# Patient Record
Sex: MALE | Race: WHITE | NOT HISPANIC OR LATINO | URBAN - METROPOLITAN AREA
[De-identification: names, ages, dates, MRNs, and addresses within clinical notes are randomized per-mention and may not be internally consistent; named-entity substitution may affect disease eponyms.]

---

## 2021-06-10 ENCOUNTER — EVALUATION (OUTPATIENT)
Dept: PHYSICAL THERAPY | Age: 71
End: 2021-06-10
Payer: MEDICARE

## 2021-06-10 DIAGNOSIS — M16.12 OSTEOARTHRITIS OF LEFT HIP, UNSPECIFIED OSTEOARTHRITIS TYPE: ICD-10-CM

## 2021-06-10 DIAGNOSIS — M25.552 LEFT HIP PAIN: Primary | ICD-10-CM

## 2021-06-10 PROCEDURE — 97140 MANUAL THERAPY 1/> REGIONS: CPT | Performed by: PHYSICAL THERAPIST

## 2021-06-10 PROCEDURE — 97162 PT EVAL MOD COMPLEX 30 MIN: CPT | Performed by: PHYSICAL THERAPIST

## 2021-06-10 PROCEDURE — 97110 THERAPEUTIC EXERCISES: CPT | Performed by: PHYSICAL THERAPIST

## 2021-06-10 NOTE — LETTER
2021    Sangita Helms MD  [facility]    Patient: Ira Estrada   YOB: 1950   Date of Visit: 6/10/2021     Encounter Diagnosis     ICD-10-CM    1  Left hip pain  M25 552    2  Osteoarthritis of left hip, unspecified osteoarthritis type  M16 12        Dear Dr Albania Cruz: Thank you for your recent referral of Ira Estrada  Please review the attached evaluation summary from [facility] recent visit  Please verify that you agree with the plan of care by signing the attached order  If you have any questions or concerns, please do not hesitate to call  I sincerely appreciate the opportunity to share in the care of one of your patients and hope to have another opportunity to work with you in the near future  Sincerely,    Jeb Anaya, PT      Referring Provider:      I certify that I have read the below Plan of Care and certify the need for these services furnished under this plan of treatment while under my care  Sangita Helms MD  [facility]  Via Fax: [phone]          PT Evaluation     Today's date: 6/10/2021  Patient name: Ira Estrada  : 1950  MRN: [id number]  Referring provider: Victor M Cevallos MD  Dx:   Encounter Diagnosis     ICD-10-CM    1  Left hip pain  M25 552    2  Osteoarthritis of left hip, unspecified osteoarthritis type  M16 12        Start Time: 1300  Stop Time: 1400  Total time in clinic (min): 60 minutes    Assessment  Assessment details: Ira Estrada is a 79 y o  male who presents with pain, decreased ROM and ambulatory dysfunction  Due to these impairments, Patient has difficulty performing a/iadls and recreational activities  Patient's clinical presentation is consistent with their referring diagnosis of left hip OA/pain  Patient was instructed in HEP today with copy given   Patient would benefit from skilled physical therapy to address their aforementioned impairments, improve their level of function and to improve their overall quality of life  Impairments: abnormal or restricted ROM, activity intolerance, impaired physical strength, lacks appropriate home exercise program and pain with function    Symptom irritability: lowUnderstanding of Dx/Px/POC: good   Prognosis: good    Goals  ST-3 WEEKS  1  Decrease pain left hip < 7/10 on VAS at its worst   2   Increase AROM by > 5 deg in all deficients planes  3   Increase core and LLE strength by 1/2 MMT grade in all deficient planes  LT-6 WEEKS  1  Patient to be independent with a/iadls  2  Increase functional activities for leisure and home activities to previous LOF  3  Independent with HEP and/or fitness program     Plan  Patient would benefit from: skilled physical therapy  Planned modality interventions: cryotherapy, thermotherapy: hydrocollator packs and unattended electrical stimulation  Planned therapy interventions: activity modification, behavior modification, body mechanics training, aquatic therapy, flexibility, functional ROM exercises, home exercise program, IADL retraining, joint mobilization, manual therapy, neuromuscular re-education, patient education, postural training, strengthening, stretching, therapeutic activities, therapeutic exercise, abdominal trunk stabilization and balance/weight bearing training  Frequency: 2-3x week  Duration in weeks: 12  Plan of Care beginning date: 6/10/2021  Plan of Care expiration date: 9/10/2021  Treatment plan discussed with: patient        Subjective Evaluation    History of Present Illness  Mechanism of injury: Patient reports his back was hurting last year and he had xrays and MRI detecting L4-5 HNP  About 2 months ago, he stated his left hip began to present issues with pain and lack of mobility  He saw ortho who stated OA but not ready for surgery yet and referred to PT     Quality of life: excellent    Pain  Current pain ratin  At worst pain ratin  Location: left hip  Quality: sharp and knife-like  Relieving factors: change in position and rest  Progression: no change    Social Support  Steps to enter house: yes  Stairs in house: no   Lives in: [place]  Lives with: alone    Employment status: not working    Diagnostic Tests  X-ray: abnormal  MRI studies: abnormal  Patient Goals  Patient goals for therapy: decreased pain, increased motion, increased strength and return to sport/leisure activities  Patient goal: able to put leg over tractor, motorcycle, able to [place]  Objective     Neurological Testing     Sensation     Hip   Left Hip   Intact: light touch and hot/cold discrimination    Active Range of Motion     Lumbar   Flexion: Active lumbar flexion: tips to proximal shin  Restriction level: moderate  Extension: 5 degrees  Restriction level: maximal  Left lateral flexion: 15 degrees       Right lateral flexion: 15 degrees   Left Hip   Flexion: 105 degrees   Abduction: 18 degrees with pain  External rotation (90/90): 18 degrees   Internal rotation (90/90): 10 degrees with pain    Right Hip   Flexion: 100 degrees   Abduction: 18 degrees   External rotation (90/90): 30 degrees   Internal rotation (90/90): 10 degrees     Additional Active Range of Motion Details  Tight hamstrings bilateral 60°  Tight end feel both hips    Strength/Myotome Testing     Left Hip   Planes of Motion   Flexion: 4  Extension: 4+  Abduction: 4+  Adduction: 5    Right Hip   Planes of Motion   Flexion: 4+  Extension: 4+  Abduction: 4+  Adduction: 5    Left Knee   Flexion: 5  Extension: 5    Right Knee   Flexion: 5  Extension: 5    Tests     Left Hip   Positive FADIR  Negative SAVI  Right Hip   Negative SAVI and FADIR       Ambulation   Weight-Bearing Status   Weight-Bearing Status (Left): weight-bearing as tolerated   Assistive device used: none    Ambulation: Level Surfaces   Ambulation without assistive device: independent    Ambulation: Stairs Pattern: reciprocal  Pattern: reciprocal    Observational Gait   Gait: antalgic   Decreased walking speed  Quality of Movement During Gait     Foot Alignment    Foot Alignment (Left): Positive planovalgus  Foot Alignment (Right): Positive planovalgus       Functional Assessment        Single Leg Stance   Left: 2 seconds  Right: 5 seconds             Precautions: DJD, OA      Manuals 6/10            LB/LE's/ Hams 10'                                                   Neuro Re-Ed                                       Ther Ex             HEP 10'            B heel slides 30x            L heel slides 30x            bridges 30x            Standing SL hip flex, abd, ext HEP            SLR L nv            SLR L w/ER nv            Hip abd-band 30x            Hip add-ball 30x                                      nustep 5'            Slant board 30"4x                         Ther Activity                                       Gait Training                                       Modalities

## 2021-06-10 NOTE — PROGRESS NOTES
PT Evaluation     Today's date: 6/10/2021  Patient name: Óscar Brar  : 1950  MRN: [id number]  Referring provider: Riley Ramírez MD  Dx:   Encounter Diagnosis     ICD-10-CM    1  Left hip pain  M25 552    2  Osteoarthritis of left hip, unspecified osteoarthritis type  M16 12        Start Time: 1300  Stop Time: 1400  Total time in clinic (min): 60 minutes    Assessment  Assessment details: Óscar Brar is a 79 y o  male who presents with pain, decreased ROM and ambulatory dysfunction  Due to these impairments, Patient has difficulty performing a/iadls and recreational activities  Patient's clinical presentation is consistent with their referring diagnosis of left hip OA/pain  Patient was instructed in HEP today with copy given  Patient would benefit from skilled physical therapy to address their aforementioned impairments, improve their level of function and to improve their overall quality of life  Impairments: abnormal or restricted ROM, activity intolerance, impaired physical strength, lacks appropriate home exercise program and pain with function    Symptom irritability: lowUnderstanding of Dx/Px/POC: good   Prognosis: good    Goals  ST-3 WEEKS  1  Decrease pain left hip < 7/10 on VAS at its worst   2   Increase AROM by > 5 deg in all deficients planes  3   Increase core and LLE strength by 1/2 MMT grade in all deficient planes  LT-6 WEEKS  1  Patient to be independent with a/iadls  2  Increase functional activities for leisure and home activities to previous LOF    3  Independent with HEP and/or fitness program     Plan  Patient would benefit from: skilled physical therapy  Planned modality interventions: cryotherapy, thermotherapy: hydrocollator packs and unattended electrical stimulation  Planned therapy interventions: activity modification, behavior modification, body mechanics training, aquatic therapy, flexibility, functional ROM exercises, home exercise program, IADL retraining, joint mobilization, manual therapy, neuromuscular re-education, patient education, postural training, strengthening, stretching, therapeutic activities, therapeutic exercise, abdominal trunk stabilization and balance/weight bearing training  Frequency: 2-3x week  Duration in weeks: 12  Plan of Care beginning date: 6/10/2021  Plan of Care expiration date: 9/10/2021  Treatment plan discussed with: patient        Subjective Evaluation    History of Present Illness  Mechanism of injury: Patient reports his back was hurting last year and he had xrays and MRI detecting L4-5 HNP  About 2 months ago, he stated his left hip began to present issues with pain and lack of mobility  He saw ortho who stated OA but not ready for surgery yet and referred to PT  Quality of life: excellent    Pain  Current pain ratin  At worst pain ratin  Location: left hip  Quality: sharp and knife-like  Relieving factors: change in position and rest  Progression: no change    Social Support  Steps to enter house: yes  Stairs in house: no   Lives in: [facility]  Lives with: alone    Employment status: not working    Diagnostic Tests  X-ray: abnormal  MRI studies: abnormal  Patient Goals  Patient goals for therapy: decreased pain, increased motion, increased strength and return to sport/leisure activities  Patient goal: able to put leg over tractor, motorcycle, able to [place]  Objective     Neurological Testing     Sensation     Hip   Left Hip   Intact: light touch and hot/cold discrimination    Active Range of Motion     Lumbar   Flexion: Active lumbar flexion: tips to proximal shin    Restriction level: moderate  Extension: 5 degrees  Restriction level: maximal  Left lateral flexion: 15 degrees       Right lateral flexion: 15 degrees   Left Hip   Flexion: 105 degrees   Abduction: 18 degrees with pain  External rotation (90/90): 18 degrees   Internal rotation (90/90): 10 degrees with pain    Right Hip   Flexion: 100 degrees   Abduction: 18 degrees   External rotation (90/90): 30 degrees   Internal rotation (90/90): 10 degrees     Additional Active Range of Motion Details  Tight hamstrings bilateral 60°  Tight end feel both hips    Strength/Myotome Testing     Left Hip   Planes of Motion   Flexion: 4  Extension: 4+  Abduction: 4+  Adduction: 5    Right Hip   Planes of Motion   Flexion: 4+  Extension: 4+  Abduction: 4+  Adduction: 5    Left Knee   Flexion: 5  Extension: 5    Right Knee   Flexion: 5  Extension: 5    Tests     Left Hip   Positive FADIR  Negative SAVI  Right Hip   Negative SAVI and FADIR  Ambulation   Weight-Bearing Status   Weight-Bearing Status (Left): weight-bearing as tolerated   Assistive device used: none    Ambulation: Level Surfaces   Ambulation without assistive device: independent    Ambulation: Stairs   Pattern: reciprocal  Pattern: reciprocal    Observational Gait   Gait: antalgic   Decreased walking speed  Quality of Movement During Gait     Foot Alignment    Foot Alignment (Left): Positive planovalgus  Foot Alignment (Right): Positive planovalgus       Functional Assessment        Single Leg Stance   Left: 2 seconds  Right: 5 seconds             Precautions: DJD, OA      Manuals 6/10            LB/LE's/ Hams 10'                                                   Neuro Re-Ed                                       Ther Ex             HEP 10'            B heel slides 30x            L heel slides 30x            bridges 30x            Standing SL hip flex, abd, ext HEP            SLR L nv            SLR L w/ER nv            Hip abd-band 30x            Hip add-ball 30x                                      nustep 5'            Slant board 30"4x                         Ther Activity                                       Gait Training                                       Modalities

## 2021-06-11 ENCOUNTER — TRANSCRIBE ORDERS (OUTPATIENT)
Dept: PHYSICAL THERAPY | Age: 71
End: 2021-06-11

## 2021-06-11 DIAGNOSIS — M16.12 OSTEOARTHRITIS OF LEFT HIP, UNSPECIFIED OSTEOARTHRITIS TYPE: ICD-10-CM

## 2021-06-11 DIAGNOSIS — M25.552 LEFT HIP PAIN: Primary | ICD-10-CM

## 2021-06-14 ENCOUNTER — OFFICE VISIT (OUTPATIENT)
Dept: PHYSICAL THERAPY | Age: 71
End: 2021-06-14
Payer: MEDICARE

## 2021-06-14 DIAGNOSIS — M16.12 OSTEOARTHRITIS OF LEFT HIP, UNSPECIFIED OSTEOARTHRITIS TYPE: ICD-10-CM

## 2021-06-14 DIAGNOSIS — M25.552 LEFT HIP PAIN: Primary | ICD-10-CM

## 2021-06-14 PROCEDURE — 97110 THERAPEUTIC EXERCISES: CPT | Performed by: PHYSICAL THERAPIST

## 2021-06-14 NOTE — PROGRESS NOTES
Daily Note     Today's date: 2021  Patient name: Suki Porter  : 1950  MRN: [id number]  Referring provider: Katherine Petit MD  Dx:   Encounter Diagnosis     ICD-10-CM    1  Left hip pain  M25 552    2  Osteoarthritis of left hip, unspecified osteoarthritis type  M16 12        Start Time: 0730  Stop Time: 0815  Total time in clinic (min): 45 minutes    Subjective: Patient reports he was a little sore all over after last session  Objective: See treatment diary below      Assessment: Tolerated treatment well  Patient would benefit from continued PT  Stiffness noted in left hip compared to right  Plan: Continue per plan of care        Precautions: DJD, OA      Manuals 6/10 6/14           LB/LE's/ Hams 10' 10                                                  Neuro Re-Ed             Side/side  2x           SLS   nv           Forw/back tandem  2x           Ther Ex             HEP 10'            B heel slides 30x 30x           L heel slides 30x 30x           bridges 30x 30x           Standing SL hip flex, abd, ext HEP            SLR L nv 30x           SLR L w/ER nv 3x10           Hip abd-band 30x 30x           Hip add-ball 30x 30x                                     nustep 5' 6'           Slant board 30"4x 30' 4x L 2                        Ther Activity                                       Gait Training                                       Modalities

## 2021-06-18 ENCOUNTER — OFFICE VISIT (OUTPATIENT)
Dept: PHYSICAL THERAPY | Age: 71
End: 2021-06-18
Payer: MEDICARE

## 2021-06-18 DIAGNOSIS — M25.552 LEFT HIP PAIN: Primary | ICD-10-CM

## 2021-06-18 DIAGNOSIS — M16.12 OSTEOARTHRITIS OF LEFT HIP, UNSPECIFIED OSTEOARTHRITIS TYPE: ICD-10-CM

## 2021-06-18 PROCEDURE — 97112 NEUROMUSCULAR REEDUCATION: CPT | Performed by: PHYSICAL THERAPIST

## 2021-06-18 PROCEDURE — 97110 THERAPEUTIC EXERCISES: CPT | Performed by: PHYSICAL THERAPIST

## 2021-06-18 PROCEDURE — 97140 MANUAL THERAPY 1/> REGIONS: CPT | Performed by: PHYSICAL THERAPIST

## 2021-06-18 NOTE — PROGRESS NOTES
Daily Note     Today's date: 2021  Patient name: Julisa Barrera  : 1950  MRN: [id number]  Referring provider: Rylie Greer MD  Dx:   Encounter Diagnosis     ICD-10-CM    1  Left hip pain  M25 552    2  Osteoarthritis of left hip, unspecified osteoarthritis type  M16 12        Start Time: 0844  Stop Time: 0815  Total time in clinic (min): 40 minutes    Subjective: Patient reports the same, but not worse  Notes less pinching in his left hip overall  Objective: See treatment diary below      Assessment: Tolerated treatment well, bl hamstring tightness, some end range hip tightness all planes, no c/o pain with TE  Patient exhibited good technique with therapeutic exercises and would benefit from continued PT      Plan: Continue per plan of care        Precautions: DJD, OA      Manuals 6/10 6/14 6/18 6/21         LB/LE's/ Hams 10' 10 10                                                 Neuro Re-Ed             Side/side  2x 2x blue 2x         SLS   nv  10"x2         Forw/back tandem  2x 2x 2x         Ther Ex             HEP 10'            B heel slides 30x 30x 30x 30x         L heel slides 30x 30x 30x 30x         bridges 30x 30x 30x 30x         Standing SL hip flex, abd, ext HEP  Hip [id number] 30x         SLR L nv 30x 30x 30x         SLR L w/ER nv 3x10 30x 30x         Hip abd-band 30x 30x 30x 30x         Hip add-ball 30x 30x nt          Clamshell L   30x 30x         Hip abduction SL Left   30x 30x         nustep 5' 6' 7' 7'         Slant board 30"4x 30' 4x L 2 30" 4x 30"x4         Hip adduction   30# 30x 30#/30         Hip abduction   30# 30x 30#/30         Ther Activity                                       Gait Training                                       Modalities

## 2021-06-21 ENCOUNTER — OFFICE VISIT (OUTPATIENT)
Dept: PHYSICAL THERAPY | Age: 71
End: 2021-06-21
Payer: MEDICARE

## 2021-06-21 DIAGNOSIS — M25.552 LEFT HIP PAIN: Primary | ICD-10-CM

## 2021-06-21 DIAGNOSIS — M16.12 OSTEOARTHRITIS OF LEFT HIP, UNSPECIFIED OSTEOARTHRITIS TYPE: ICD-10-CM

## 2021-06-21 PROCEDURE — 97112 NEUROMUSCULAR REEDUCATION: CPT

## 2021-06-21 PROCEDURE — 97110 THERAPEUTIC EXERCISES: CPT

## 2021-06-25 ENCOUNTER — OFFICE VISIT (OUTPATIENT)
Dept: PHYSICAL THERAPY | Age: 71
End: 2021-06-25
Payer: MEDICARE

## 2021-06-25 DIAGNOSIS — M25.552 LEFT HIP PAIN: Primary | ICD-10-CM

## 2021-06-25 DIAGNOSIS — M16.12 OSTEOARTHRITIS OF LEFT HIP, UNSPECIFIED OSTEOARTHRITIS TYPE: ICD-10-CM

## 2021-06-25 PROCEDURE — 97110 THERAPEUTIC EXERCISES: CPT | Performed by: PHYSICAL THERAPIST

## 2021-06-25 PROCEDURE — 97140 MANUAL THERAPY 1/> REGIONS: CPT | Performed by: PHYSICAL THERAPIST

## 2021-06-25 PROCEDURE — 97530 THERAPEUTIC ACTIVITIES: CPT | Performed by: PHYSICAL THERAPIST

## 2021-06-25 NOTE — PROGRESS NOTES
Daily Note     Today's date: 2021  Patient name: Jolynn Marrero  : 1950  MRN: [id number]  Referring provider: Topher Read MD  Dx:   Encounter Diagnosis     ICD-10-CM    1  Left hip pain  M25 552    2  Osteoarthritis of left hip, unspecified osteoarthritis type  M16 12        Start Time: 0700  Stop Time: 08  Total time in clinic (min): 65 minutes    Subjective: Patient reports he still gets the sharp pain down left leg with certain movements  Objective: See treatment diary below      Assessment: Tolerated treatment well  Patient would benefit from continued PT  Decreased ROM left hip ER and stiff with abduction  Added hip flexor stretch standing left  Pain with walking on hill or unlevel surface  Added step ups onto slant board L2 and gets shooting pains when knee flexed  Plan: Continue per plan of care        Precautions: DJD, OA      Manuals 6/10 6/14 6/18 6/21 6/25        LB/LE's/ Hams 10' 10 10  10                                               Neuro Re-Ed             Side/side  2x 2x blue 2x 4x        SLS   nv  10"x2 5" 5x        Forw/back tandem  2x 2x 2x nt        Ther Ex             HEP 10'            B heel slides 30x 30x 30x 30x 30x        L heel slides 30x 30x 30x 30x 30x        bridges 30x 30x 30x 30x 30x        Standing SL hip flex, abd, ext HEP  Hip [id number] 30x         SLR L nv 30x 30x 30x 1# 30x        SLR L w/ER nv 3x10 30x 30x 1# 30x        Hip abd-band 30x 30x 30x 30x 1# 30x        Hip add-ball 30x 30x nt  home        Clamshell L   30x 30x 30x        Hip abduction SL Left   30x 30x 30x        nustep 5' 6' 7' 7' D/c        Slant board 30"4x 30' 4x L 2 30" 4x 30"x4 4x        Hip adduction   30# 30x 30#/30 30# 30x        Hip abduction   30# 30x 30#/30 30#         Leg press     50# 30x        Stand ball press     5" 10x        Ther Activity             DLS     2'        bike     10'        Step up onto slant L2     5x                                  Modalities

## 2021-06-28 ENCOUNTER — OFFICE VISIT (OUTPATIENT)
Dept: PHYSICAL THERAPY | Age: 71
End: 2021-06-28
Payer: MEDICARE

## 2021-06-28 DIAGNOSIS — M16.12 OSTEOARTHRITIS OF LEFT HIP, UNSPECIFIED OSTEOARTHRITIS TYPE: ICD-10-CM

## 2021-06-28 DIAGNOSIS — M25.552 LEFT HIP PAIN: Primary | ICD-10-CM

## 2021-06-28 PROCEDURE — 97112 NEUROMUSCULAR REEDUCATION: CPT

## 2021-06-28 PROCEDURE — 97110 THERAPEUTIC EXERCISES: CPT

## 2021-06-28 NOTE — PROGRESS NOTES
Daily Note     Today's date: 2021  Patient name: Pascale Manjarrez  : 1950  MRN: [id number]  Referring provider: Lorena Urbina MD  Dx:   Encounter Diagnosis     ICD-10-CM    1  Left hip pain  M25 552    2  Osteoarthritis of left hip, unspecified osteoarthritis type  M16 12        Start Time: 0745  Stop Time: 0830  Total time in clinic (min): 45 minutes    Subjective: Patient reports about the same with everything  Still having sharp pain in his left hip with certain movements  Objective: See treatment diary below      Assessment: Tolerated treatment well,Decreased ROM left hip ER and stiff with abduction  SLR challenges  Patient exhibited good technique with therapeutic exercises and would benefit from continued PT      Plan: Continue per plan of care        Precautions: DJD, OA      Manuals 6/10 6/14 6/18 6/21 6/25 6/28       LB/LE's/ Hams 10' 10 10  10 10                                              Neuro Re-Ed             Side/side  2x 2x blue 2x 4x 4x       SLS   nv  10"x2 5" 5x 10"x3       Forw/back tandem  2x 2x 2x nt nt       Ther Ex             HEP 10'            B heel slides 30x 30x 30x 30x 30x 30x       L heel slides 30x 30x 30x 30x 30x 30x       bridges 30x 30x 30x 30x 30x 30x       Standing SL hip flex, abd, ext HEP  Hip [id number] 30x         SLR L nv 30x 30x 30x 1# 30x 1#/30       SLR L w/ER nv 3x10 30x 30x 1# 30x 1#/30       Hip abd-band 30x 30x 30x 30x 1# 30x 1#/30       Hip add-ball 30x 30x nt  home        Clamshell L   30x 30x 30x 30x       Hip abduction SL Left   30x 30x 30x 30x       nustep 5' 6' 7' 7' D/c        Slant board 30"4x 30' 4x L 2 30" 4x 30"x4 4x 4x       Hip adduction   30# 30x 30#/30 30# 30x 30#/30       Hip abduction   30# 30x 30#/30 30#  30#/30       Leg press     50# 30x 50#/30       Stand ball press     5" 10x 5x10       Ther Activity             DLS     2' nt       bike     10' 10'       Step up onto slant L2     5x 4x Modalities

## 2021-07-02 ENCOUNTER — OFFICE VISIT (OUTPATIENT)
Dept: PHYSICAL THERAPY | Age: 71
End: 2021-07-02
Payer: MEDICARE

## 2021-07-02 DIAGNOSIS — M25.552 LEFT HIP PAIN: Primary | ICD-10-CM

## 2021-07-02 DIAGNOSIS — M16.12 OSTEOARTHRITIS OF LEFT HIP, UNSPECIFIED OSTEOARTHRITIS TYPE: ICD-10-CM

## 2021-07-02 PROCEDURE — 97110 THERAPEUTIC EXERCISES: CPT | Performed by: PHYSICAL THERAPIST

## 2021-07-02 PROCEDURE — 97112 NEUROMUSCULAR REEDUCATION: CPT | Performed by: PHYSICAL THERAPIST

## 2021-07-02 NOTE — PROGRESS NOTES
Daily Note     Today's date: 2021  Patient name: Darya Akins  : 1950  MRN: [id number]  Referring provider: Howard Denis MD  Dx:   Encounter Diagnosis     ICD-10-CM    1  Left hip pain  M25 552    2  Osteoarthritis of left hip, unspecified osteoarthritis type  M16 12        Start Time: 0700  Stop Time: 08  Total time in clinic (min): 65 minutes    Subjective: Patient still c/o sharp pain with sporadic movements of left hip, especially stepping out of golf cart or on unlevel surfaces, up hills  Objective: See treatment diary below      Assessment: Tolerated treatment well  Patient would benefit from continued PT  Stiffness in left hip and hamstring noted compared to right  Patient is compliant with HEP and stretches  No sharp pains today on step  Plan: Continue per plan of care        Precautions: DJD, OA      Manuals 6/10 6/14 6/18 6/21 6/25 6/28 7      LB/LE's/ Hams 10' 10 10  10 10 10                                             Neuro Re-Ed             Side/side  2x 2x blue 2x 4x 4x 4x      SLS   nv  10"x2 5" 5x 10"x3 10" 4x      Forw/back tandem  2x 2x 2x nt nt       Ther Ex             HEP 10'            B heel slides 30x 30x 30x 30x 30x 30x 30x      L heel slides 30x 30x 30x 30x 30x 30x       bridges 30x 30x 30x 30x 30x 30x 30x      Standing SL hip flex, abd, ext HEP  Hip [id number] 30x         SLR L nv 30x 30x 30x 1# 30x 1#/30 1# 30x      SLR L w/ER nv 3x10 30x 30x 1# 30x 1#/30 1# 30x                                Clamshell L   30x 30x 30x 30x 1# 30x      Hip abduction SL Left   30x 30x 30x 30x 1# 30x                   Slant board 30"4x 30' 4x L 2 30" 4x 30"x4 4x 4x 4x      Hip adduction   30# 30x 30#/30 30# 30x 30#/30 35# 30x      Hip abduction   30# 30x 30#/30 30#  30#/30 35# 30x      Leg press     50# 30x 50#/30 60# 30x      Stand ball press     5" 10x 5x10 5" 20x      Ther Activity             DLS     2' nt       bike     10' 10' 10'      Step up onto slant L2     5x 4x 10x                                Modalities

## 2021-07-06 ENCOUNTER — OFFICE VISIT (OUTPATIENT)
Dept: PHYSICAL THERAPY | Age: 71
End: 2021-07-06
Payer: MEDICARE

## 2021-07-06 DIAGNOSIS — M16.12 OSTEOARTHRITIS OF LEFT HIP, UNSPECIFIED OSTEOARTHRITIS TYPE: ICD-10-CM

## 2021-07-06 DIAGNOSIS — M25.552 LEFT HIP PAIN: Primary | ICD-10-CM

## 2021-07-06 PROCEDURE — 97110 THERAPEUTIC EXERCISES: CPT

## 2021-07-06 PROCEDURE — 97112 NEUROMUSCULAR REEDUCATION: CPT

## 2021-07-06 NOTE — PROGRESS NOTES
Daily Note     Today's date: 2021  Patient name: Gaye Bernardo  : 1950  MRN: [id number]  Referring provider: Kandace Dillon MD  Dx:   Encounter Diagnosis     ICD-10-CM    1  Left hip pain  M25 552    2  Osteoarthritis of left hip, unspecified osteoarthritis type  M16 12        Start Time: 0815  Stop Time: 0900  Total time in clinic (min): 45 minutes    Subjective: Patient reports about the same, notes its not constant anymore  Objective: See treatment diary below      Assessment: Tolerated treatment well, continues to have increased tightness bl hips, added sl leg press with good tolerance, no c/o pain with TE, increased step ups with slant board but only tolerated 3x due to pain in the left hip  Patient demonstrated fatigue post treatment and exhibited good technique with therapeutic exercises      Plan: Continue per plan of care        Precautions: DJD, OA      Manuals 6/10 6/14 6/18 6/21 6/25 6/28 7/2 7/6     LB/LE's/ Hams 10' 10 10  10 10 10 10                                            Neuro Re-Ed             Side/side  2x 2x blue 2x 4x 4x 4x 4x     SLS   nv  10"x2 5" 5x 10"x3 10" 4x 10"  4x     Forw/back tandem  2x 2x 2x nt nt       Ther Ex             HEP 10'            B heel slides 30x 30x 30x 30x 30x 30x 30x 30x     L heel slides 30x 30x 30x 30x 30x 30x       bridges 30x 30x 30x 30x 30x 30x 30x 30x     Standing SL hip flex, abd, ext HEP  Hip [id number] 30x         SLR L nv 30x 30x 30x 1# 30x 1#/30 1# 30x 1#/  30     SLR L w/ER nv 3x10 30x 30x 1# 30x 1#/30 1# 30x 1#/  30                               Clamshell L   30x 30x 30x 30x 1# 30x 1#/30     Hip abduction SL Left   30x 30x 30x 30x 1# 30x 1#/30                  Slant board 30"4x 30' 4x L 2 30" 4x 30"x4 4x 4x 4x 4x     Hip adduction   30# 30x 30#/30 30# 30x 30#/30 35# 30x 35#/30     Hip abduction   30# 30x 30#/30 30#  30#/30 35# 30x 35#/30     Leg press     50# 30x 50#/30 60# 30x 60#/30x     Stand ball press     5" 10x 5x10 5" 20x 5"x20     Ther Activity             DLS     2' nt       bike     10' 10' 10' 10'     Step up onto slant L2     5x 4x 10x 10x  [email or web address]     SL leg press        30#/30                  Modalities

## 2021-07-07 ENCOUNTER — APPOINTMENT (OUTPATIENT)
Dept: PHYSICAL THERAPY | Age: 71
End: 2021-07-07
Payer: MEDICARE

## 2021-07-09 ENCOUNTER — OFFICE VISIT (OUTPATIENT)
Dept: PHYSICAL THERAPY | Age: 71
End: 2021-07-09
Payer: MEDICARE

## 2021-07-09 DIAGNOSIS — M25.552 LEFT HIP PAIN: Primary | ICD-10-CM

## 2021-07-09 DIAGNOSIS — M16.12 OSTEOARTHRITIS OF LEFT HIP, UNSPECIFIED OSTEOARTHRITIS TYPE: ICD-10-CM

## 2021-07-09 PROCEDURE — 97530 THERAPEUTIC ACTIVITIES: CPT | Performed by: PHYSICAL THERAPIST

## 2021-07-09 PROCEDURE — 97110 THERAPEUTIC EXERCISES: CPT | Performed by: PHYSICAL THERAPIST

## 2021-07-09 NOTE — PROGRESS NOTES
Daily Note     Today's date: 2021  Patient name: Becca Ramos  : 1950  MRN: [id number]  Referring provider: Jamie Crouch MD  Dx:   Encounter Diagnosis     ICD-10-CM    1  Left hip pain  M25 552    2  Osteoarthritis of left hip, unspecified osteoarthritis type  M16 12        Start Time: 0700  Stop Time: 0804  Total time in clinic (min): 64 minutes    Subjective: Patient reports no change  Objective: See treatment diary below      Assessment: Tolerated treatment well  Patient would benefit from continued PT  Stiffness in left hip  Still gets sharp pains in anterior hip with abducted LE's, but forward is better  Plan: Continue per plan of care        Precautions: DJD, OA      Manuals 6/10 6/14 6/18 6/21 6/25 6/28 7/2 7/6 7/9    LB/LE's/ Hams 10' 10 10  10 10 10 10 10                                           Neuro Re-Ed             Side/side  2x 2x blue 2x 4x 4x 4x 4x 2xea reg, squat    Forward step ups         4" 10x    SLS   nv  10"x2 5" 5x 10"x3 10" 4x 10"  4x nt    Lateral step ups         4" 20x    Ther Ex             HEP 10'            B heel slides 30x 30x 30x 30x 30x 30x 30x 30x 30x    L heel slides 30x 30x 30x 30x 30x 30x       bridges 30x 30x 30x 30x 30x 30x 30x 30x 30x    Standing SL hip flex, abd, ext HEP  Hip [id number] 30x         SLR L nv 30x 30x 30x 1# 30x 1#/30 1# 30x 1#/  30 1# 30x    SLR L w/ER nv 3x10 30x 30x 1# 30x 1#/30 1# 30x 1#/  30 1# 30x                              Clamshell L   30x 30x 30x 30x 1# 30x 1#/30 1# 30x    Hip abduction SL Left   30x 30x 30x 30x 1# 30x 1#/30 1# 30x                 Slant board 30"4x 30' 4x L 2 30" 4x 30"x4 4x 4x 4x 4x 4x    Hip adduction   30# 30x 30#/30 30# 30x 30#/30 35# 30x 35#/30 35# 30x    Hip abduction   30# 30x 30#/30 30#  30#/30 35# 30x 35#/30 35# 30x    Leg press     50# 30x 50#/30 60# 30x 60#/30x 60# 30x    Stand ball press     5" 10x 5x10 5" 20x 5"x20 5" 20x    Ther Activity             DLS     2' nt       bike     10' 10' 10' 10' 10'    Step up onto slant L2     5x 4x 10x 10x  [email or web address] 20x    SL leg press L        30#/30 50# 30x                 Modalities

## 2021-07-12 ENCOUNTER — EVALUATION (OUTPATIENT)
Dept: PHYSICAL THERAPY | Age: 71
End: 2021-07-12
Payer: MEDICARE

## 2021-07-12 DIAGNOSIS — M25.552 LEFT HIP PAIN: Primary | ICD-10-CM

## 2021-07-12 DIAGNOSIS — M16.12 OSTEOARTHRITIS OF LEFT HIP, UNSPECIFIED OSTEOARTHRITIS TYPE: ICD-10-CM

## 2021-07-12 PROCEDURE — 97110 THERAPEUTIC EXERCISES: CPT | Performed by: PHYSICAL THERAPIST

## 2021-07-12 PROCEDURE — 97112 NEUROMUSCULAR REEDUCATION: CPT | Performed by: PHYSICAL THERAPIST

## 2021-07-12 NOTE — LETTER
2021    Konrad Rajan MD  [facility]    Patient: Amita Bishop   YOB: 1950   Date of Visit: 2021     Encounter Diagnosis     ICD-10-CM    1  Left hip pain  M25 552    2  Osteoarthritis of left hip, unspecified osteoarthritis type  M16 12        Dear Dr Rodger Dale: Thank you for your recent referral of Amita Bishop  Please review the attached evaluation summary from [facility] recent visit  Please verify that you agree with the plan of care by signing the attached order  If you have any questions or concerns, please do not hesitate to call  I sincerely appreciate the opportunity to share in the care of one of your patients and hope to have another opportunity to work with you in the near future  Sincerely,    Harmony Mir, PT      Referring Provider:      I certify that I have read the below Plan of Care and certify the need for these services furnished under this plan of treatment while under my care  Konrad Rajan MD  [facility]  Via Fax: [phone]          PT Re-Evaluation     Today's date: 2021  Patient name: Amita Bishop  : 1950  MRN: [id number]  Referring provider: Tricia Trotter MD  Dx:   Encounter Diagnosis     ICD-10-CM    1  Left hip pain  M25 552    2  Osteoarthritis of left hip, unspecified osteoarthritis type  M16 12        Start Time: 0700  Stop Time: 0800  Total time in clinic (min): 60 minutes    Assessment  Assessment details: Amita Bishop is a 79 y o  male who presents with pain, decreased ROM and ambulatory dysfunction  Due to these impairments, Patient has difficulty performing a/iadls and recreational activities  Patient's clinical presentation is consistent with their referring diagnosis of left hip OA/pain  Patient has been having PT for a month  He feels the pain is more frequent and even with walking now   He is more flexible but significant tight end range left hip and hamstring compared to right  Patient gets sharp pain in anterior left hip when left hip is flexed or abducted  HEP today with copy given  Patient would benefit from skilled physical therapy to address their aforementioned impairments, improve their level of function and to improve their overall quality of life  Impairments: abnormal or restricted ROM, activity intolerance, impaired physical strength, lacks appropriate home exercise program and pain with function    Symptom irritability: lowUnderstanding of Dx/Px/POC: good   Prognosis: good    Goals  ST-3 WEEKS  1  Decrease pain left hip < 7/10 on VAS at its worst  Progressing towards  Ongoing goal    2   Increase AROM by > 5 deg in all deficients planes  Progressing towards  Ongoing goal   3   Increase core and LLE strength by 1/2 MMT grade in all deficient planes  Progressing towards  Ongoing goal     LT-6 WEEKS  1  Patient to be independent with a/iadls  Ongoing goal   2  Increase functional activities for leisure and home activities to previous LOF  Ongoing goal   3  Independent with HEP and/or fitness program  Ongoing goal     Plan  Patient would benefit from: skilled physical therapy  Planned modality interventions: cryotherapy, thermotherapy: hydrocollator packs and unattended electrical stimulation  Planned therapy interventions: activity modification, behavior modification, body mechanics training, aquatic therapy, flexibility, functional ROM exercises, home exercise program, IADL retraining, joint mobilization, manual therapy, neuromuscular re-education, patient education, postural training, strengthening, stretching, therapeutic activities, therapeutic exercise, abdominal trunk stabilization and balance/weight bearing training  Frequency: 2-3x week    Duration in weeks: 8  Plan of Care beginning date: 6/10/2021  Plan of Care expiration date: 9/10/2021  Treatment plan discussed with: patient        Subjective Evaluation    History of Present Illness  Mechanism of injury: Patient reports his back was hurting last year and he had xrays and MRI detecting L4-5 HNP  About 2 months ago, he stated his left hip began to present issues with pain and lack of mobility  He saw ortho who stated OA but not ready for surgery yet and referred to PT      21: Patient reports he is seeing MD on Wednesday  He states he is even getting the sharp pain in anterior left hip with walking now  Still difficult for him to get onto his bike, ect  Recurrent probem    Quality of life: excellent    Pain  Current pain ratin  At worst pain ratin  Location: left hip  Quality: sharp and knife-like  Relieving factors: change in position and rest  Aggravating factors: walking and stair climbing  Progression: worsening    Social Support  Steps to enter house: yes  Stairs in house: no   Lives in: [facility]  Lives with: alone    Employment status: not working    Diagnostic Tests  X-ray: abnormal  MRI studies: abnormal  Patient Goals  Patient goals for therapy: decreased pain, increased motion, increased strength and return to sport/leisure activities  Patient goal: able to put leg over tractor, motorcycle, able to [place]  Objective     Neurological Testing     Sensation     Hip   Left Hip   Intact: light touch and hot/cold discrimination    Active Range of Motion     Lumbar   Flexion: Active lumbar flexion: tips to proximal shin    Restriction level: moderate  Extension: 5 degrees  Restriction level: maximal  Left lateral flexion: 15 degrees       Right lateral flexion: 15 degrees   Left Hip   Flexion: 110 degrees   Abduction: 24 degrees with pain  External rotation (90/90): 30 degrees   Internal rotation (90/90): 10 degrees with pain    Right Hip   Flexion: 108 degrees   Abduction: 20 degrees   External rotation (90/90): 30 degrees   Internal rotation (90/90): 10 degrees     Additional Active Range of Motion Details  Tight hamstrings bilateral 60°  Tight end feel both hips  Hamstring flexibility: left 60°, right 68    Strength/Myotome Testing     Left Hip   Planes of Motion   Flexion: 4  Extension: 4+  Abduction: 4+  Adduction: 5    Right Hip   Planes of Motion   Flexion: 4+  Extension: 4+  Abduction: 4+  Adduction: 5    Left Knee   Flexion: 5  Extension: 5    Right Knee   Flexion: 5  Extension: 5    Tests     Left Hip   Positive FADIR  Negative SAVI  Right Hip   Negative SAVI and FADIR  Ambulation   Weight-Bearing Status   Weight-Bearing Status (Left): weight-bearing as tolerated   Assistive device used: none    Ambulation: Level Surfaces   Ambulation without assistive device: independent    Ambulation: Stairs   Pattern: reciprocal  Pattern: reciprocal    Observational Gait   Gait: antalgic   Decreased walking speed  Quality of Movement During Gait     Foot Alignment    Foot Alignment (Left): Positive planovalgus  Foot Alignment (Right): Positive planovalgus       Functional Assessment        Single Leg Stance   Left: 2 seconds  Right: 5 seconds             Precautions: DJD, OA      Manuals 6/10 6/14 6/18 6/21 6/25 6/28 7/2 7/6 7/9 7/12   LB/LE's/ Hams, hip flexors 10' 10 10  10 10 10 10 10 10                                          Neuro Re-Ed             Side/side  2x 2x blue 2x 4x 4x 4x 4x 2xea reg, squat 2x unable to do squat   Forward step ups L         4" 10x 10x   SLS   nv  10"x2 5" 5x 10"x3 10" 4x 10"  4x nt    Lateral step ups         4" 20x unable   Ther Ex             HEP 10'         10'   B heel slides 30x 30x 30x 30x 30x 30x 30x 30x 30x 30x   L heel slides 30x 30x 30x 30x 30x 30x       bridges 30x 30x 30x 30x 30x 30x 30x 30x 30x 30x   Standing SL hip flex, abd, ext HEP  Hip [id number] 30x         SLR L nv 30x 30x 30x 1# 30x 1#/30 1# 30x 1#/  30 1# 30x 1# 30x   SLR L w/ER nv 3x10 30x 30x 1# 30x 1#/30 1# 30x 1#/  30 1# 30x 1# 30x                             Clamshell L   30x 30x 30x 30x 1# 30x 1#/30 1# 30x 1# 30x   Hip abduction SL Left   30x 30x 30x 30x 1# 30x 1#/30 1# 30x 1# 30x                Slant board 30"4x 30' 4x L 2 30" 4x 30"x4 4x 4x 4x 4x 4x 4x   Hip adduction   30# 30x 30#/30 30# 30x 30#/30 35# 30x 35#/30 35# 30x 35# 30x   Hip abduction   30# 30x 30#/30 30#  30#/30 35# 30x 35#/30 35# 30x 35# 30x   Leg press     50# 30x 50#/30 60# 30x 60#/30x 60# 30x 60# 30x   Stand ball press     5" 10x 5x10 5" 20x 5"x20 5" 20x 5" 20x   Ther Activity             DLS     2' nt       bike     10' 10' 10' 10' 10' 10   Step up onto slant L2     5x 4x 10x 10x  [email or web address] 20x 20x   SL leg press L        30#/30 50# 30x 60# 30x                Modalities

## 2021-07-12 NOTE — PROGRESS NOTES
PT Re-Evaluation     Today's date: 2021  Patient name: Becca Ramos  : 1950  MRN: [id number]  Referring provider: Jamie Crouch MD  Dx:   Encounter Diagnosis     ICD-10-CM    1  Left hip pain  M25 552    2  Osteoarthritis of left hip, unspecified osteoarthritis type  M16 12        Start Time: 0700  Stop Time: 0800  Total time in clinic (min): 60 minutes    Assessment  Assessment details: Becca Ramos is a 79 y o  male who presents with pain, decreased ROM and ambulatory dysfunction  Due to these impairments, Patient has difficulty performing a/iadls and recreational activities  Patient's clinical presentation is consistent with their referring diagnosis of left hip OA/pain  Patient has been having PT for a month  He feels the pain is more frequent and even with walking now  He is more flexible but significant tight end range left hip and hamstring compared to right  Patient gets sharp pain in anterior left hip when left hip is flexed or abducted  HEP today with copy given  Patient would benefit from skilled physical therapy to address their aforementioned impairments, improve their level of function and to improve their overall quality of life  Impairments: abnormal or restricted ROM, activity intolerance, impaired physical strength, lacks appropriate home exercise program and pain with function    Symptom irritability: lowUnderstanding of Dx/Px/POC: good   Prognosis: good    Goals  ST-3 WEEKS  1  Decrease pain left hip < 7/10 on VAS at its worst  Progressing towards  Ongoing goal    2   Increase AROM by > 5 deg in all deficients planes  Progressing towards  Ongoing goal   3   Increase core and LLE strength by 1/2 MMT grade in all deficient planes  Progressing towards  Ongoing goal     LT-6 WEEKS  1  Patient to be independent with a/iadls  Ongoing goal   2  Increase functional activities for leisure and home activities to previous LOF   Ongoing goal   3  Independent with HEP and/or fitness program  Ongoing goal     Plan  Patient would benefit from: skilled physical therapy  Planned modality interventions: cryotherapy, thermotherapy: hydrocollator packs and unattended electrical stimulation  Planned therapy interventions: activity modification, behavior modification, body mechanics training, aquatic therapy, flexibility, functional ROM exercises, home exercise program, IADL retraining, joint mobilization, manual therapy, neuromuscular re-education, patient education, postural training, strengthening, stretching, therapeutic activities, therapeutic exercise, abdominal trunk stabilization and balance/weight bearing training  Frequency: 2-3x week  Duration in weeks: 8  Plan of Care beginning date: 6/10/2021  Plan of Care expiration date: 9/10/2021  Treatment plan discussed with: patient        Subjective Evaluation    History of Present Illness  Mechanism of injury: Patient reports his back was hurting last year and he had xrays and MRI detecting L4-5 HNP  About 2 months ago, he stated his left hip began to present issues with pain and lack of mobility  He saw ortho who stated OA but not ready for surgery yet and referred to PT      21: Patient reports he is seeing MD on Wednesday  He states he is even getting the sharp pain in anterior left hip with walking now  Still difficult for him to get onto his bike, ect             Recurrent probem    Quality of life: excellent    Pain  Current pain ratin  At worst pain ratin  Location: left hip  Quality: sharp and knife-like  Relieving factors: change in position and rest  Aggravating factors: walking and stair climbing  Progression: worsening    Social Support  Steps to enter house: yes  Stairs in house: no   L[facility] in: [facility]  Lives with: alone    Employment status: not working    Diagnostic Tests  X-ray: abnormal  MRI studies: abnormal  Patient Goals  Patient goals for therapy: decreased pain, increased motion, increased strength and return to sport/leisure activities  Patient goal: able to put leg over tractor, motorcycle, able to [place]  Objective     Neurological Testing     Sensation     Hip   Left Hip   Intact: light touch and hot/cold discrimination    Active Range of Motion     Lumbar   Flexion: Active lumbar flexion: tips to proximal shin  Restriction level: moderate  Extension: 5 degrees  Restriction level: maximal  Left lateral flexion: 15 degrees       Right lateral flexion: 15 degrees   Left Hip   Flexion: 110 degrees   Abduction: 24 degrees with pain  External rotation (90/90): 30 degrees   Internal rotation (90/90): 10 degrees with pain    Right Hip   Flexion: 108 degrees   Abduction: 20 degrees   External rotation (90/90): 30 degrees   Internal rotation (90/90): 10 degrees     Additional Active Range of Motion Details  Tight hamstrings bilateral 60°  Tight end feel both hips  Hamstring flexibility: left 60°, right 68    Strength/Myotome Testing     Left Hip   Planes of Motion   Flexion: 4  Extension: 4+  Abduction: 4+  Adduction: 5    Right Hip   Planes of Motion   Flexion: 4+  Extension: 4+  Abduction: 4+  Adduction: 5    Left Knee   Flexion: 5  Extension: 5    Right Knee   Flexion: 5  Extension: 5    Tests     Left Hip   Positive FADIR  Negative SAVI  Right Hip   Negative SAVI and FADIR  Ambulation   Weight-Bearing Status   Weight-Bearing Status (Left): weight-bearing as tolerated   Assistive device used: none    Ambulation: Level Surfaces   Ambulation without assistive device: independent    Ambulation: Stairs   Pattern: reciprocal  Pattern: reciprocal    Observational Gait   Gait: antalgic   Decreased walking speed  Quality of Movement During Gait     Foot Alignment    Foot Alignment (Left): Positive planovalgus  Foot Alignment (Right): Positive planovalgus       Functional Assessment        Single Leg Stance   Left: 2 seconds  Right: 5 seconds             Precautions: DJD, OA      Manuals 6/10 6/14 6/18 6/21 6/25 6/28 7/2 7/6 7/9 7/12   LB/LE's/ Hams, hip flexors 10' 10 10  10 10 10 10 10 10                                          Neuro Re-Ed             Side/side  2x 2x blue 2x 4x 4x 4x 4x 2xea reg, squat 2x unable to do squat   Forward step ups L         4" 10x 10x   SLS   nv  10"x2 5" 5x 10"x3 10" 4x 10"  4x nt    Lateral step ups         4" 20x unable   Ther Ex             HEP 10'         10'   B heel slides 30x 30x 30x 30x 30x 30x 30x 30x 30x 30x   L heel slides 30x 30x 30x 30x 30x 30x       bridges 30x 30x 30x 30x 30x 30x 30x 30x 30x 30x   Standing SL hip flex, abd, ext HEP  Hip [id number] 30x         SLR L nv 30x 30x 30x 1# 30x 1#/30 1# 30x 1#/  30 1# 30x 1# 30x   SLR L w/ER nv 3x10 30x 30x 1# 30x 1#/30 1# 30x 1#/  30 1# 30x 1# 30x                             Clamshell L   30x 30x 30x 30x 1# 30x 1#/30 1# 30x 1# 30x   Hip abduction SL Left   30x 30x 30x 30x 1# 30x 1#/30 1# 30x 1# 30x                Slant board 30"4x 30' 4x L 2 30" 4x 30"x4 4x 4x 4x 4x 4x 4x   Hip adduction   30# 30x 30#/30 30# 30x 30#/30 35# 30x 35#/30 35# 30x 35# 30x   Hip abduction   30# 30x 30#/30 30#  30#/30 35# 30x 35#/30 35# 30x 35# 30x   Leg press     50# 30x 50#/30 60# 30x 60#/30x 60# 30x 60# 30x   Stand ball press     5" 10x 5x10 5" 20x 5"x20 5" 20x 5" 20x   Ther Activity             DLS     2' nt       bike     10' 10' 10' 10' 10' 10   Step up onto slant L2     5x 4x 10x 10x  [email or web address] 20x 20x   SL leg press L        30#/30 50# 30x 60# 30x                Modalities

## 2021-07-15 ENCOUNTER — APPOINTMENT (OUTPATIENT)
Dept: PHYSICAL THERAPY | Age: 71
End: 2021-07-15
Payer: MEDICARE

## 2021-07-15 NOTE — PROGRESS NOTES
Patient saw ortho and going to have a hip replacement this fall  PT not going to help anymore  Patient d/c to HEP